# Patient Record
Sex: FEMALE | Race: WHITE | NOT HISPANIC OR LATINO | URBAN - METROPOLITAN AREA
[De-identification: names, ages, dates, MRNs, and addresses within clinical notes are randomized per-mention and may not be internally consistent; named-entity substitution may affect disease eponyms.]

---
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
## 2021-03-11 ENCOUNTER — TELEPHONE (OUTPATIENT)
Dept: INTERNAL MEDICINE | Facility: CLINIC | Age: 21
End: 2021-03-11

## 2021-03-11 ENCOUNTER — OFFICE VISIT (OUTPATIENT)
Dept: INTERNAL MEDICINE | Facility: CLINIC | Age: 21
End: 2021-03-11
Payer: COMMERCIAL

## 2021-03-11 VITALS
BODY MASS INDEX: 20.93 KG/M2 | HEART RATE: 86 BPM | WEIGHT: 125.6 LBS | HEIGHT: 65 IN | SYSTOLIC BLOOD PRESSURE: 104 MMHG | RESPIRATION RATE: 16 BRPM | TEMPERATURE: 98.5 F | DIASTOLIC BLOOD PRESSURE: 60 MMHG | OXYGEN SATURATION: 98 %

## 2021-03-11 DIAGNOSIS — L72.9 CYST OF SKIN: ICD-10-CM

## 2021-03-11 DIAGNOSIS — F84.0 AUTISM SPECTRUM DISORDER: ICD-10-CM

## 2021-03-11 DIAGNOSIS — G47.9 SLEEP DISORDER: ICD-10-CM

## 2021-03-11 DIAGNOSIS — R55 VASOVAGAL SYNCOPE: ICD-10-CM

## 2021-03-11 DIAGNOSIS — Z00.00 ANNUAL VISIT FOR GENERAL ADULT MEDICAL EXAMINATION WITHOUT ABNORMAL FINDINGS: Primary | ICD-10-CM

## 2021-03-11 DIAGNOSIS — E01.2: ICD-10-CM

## 2021-03-11 DIAGNOSIS — Z02.4 ENCOUNTER FOR DRIVER'S LICENSE HISTORY AND PHYSICAL: ICD-10-CM

## 2021-03-11 DIAGNOSIS — F32.4 MAJOR DEPRESSION SINGLE EPISODE, IN PARTIAL REMISSION (CMS/HCC): ICD-10-CM

## 2021-03-11 PROBLEM — F50.9 EATING DISORDER: Status: RESOLVED | Noted: 2017-08-24 | Resolved: 2021-03-11

## 2021-03-11 PROBLEM — F50.9 EATING DISORDER: Status: ACTIVE | Noted: 2017-08-24

## 2021-03-11 PROCEDURE — 99385 PREV VISIT NEW AGE 18-39: CPT | Performed by: NURSE PRACTITIONER

## 2021-03-11 RX ORDER — NORETHINDRONE ACETATE AND ETHINYL ESTRADIOL AND FERROUS FUMARATE 1MG-20(21)
1 KIT ORAL
COMMUNITY
Start: 2021-01-09 | End: 2022-12-20 | Stop reason: ENTERED-IN-ERROR

## 2021-03-11 ASSESSMENT — PATIENT HEALTH QUESTIONNAIRE - PHQ9
SUM OF ALL RESPONSES TO PHQ9 QUESTIONS 1 & 2: 1
SUM OF ALL RESPONSES TO PHQ QUESTIONS 1-9: 2

## 2021-03-11 ASSESSMENT — ENCOUNTER SYMPTOMS
SLEEP DISTURBANCE: 0
ALLERGIC/IMMUNOLOGIC NEGATIVE: 1
HEMATOLOGIC/LYMPHATIC NEGATIVE: 1
RESPIRATORY NEGATIVE: 1
CARDIOVASCULAR NEGATIVE: 1
MUSCULOSKELETAL NEGATIVE: 1
ENDOCRINE NEGATIVE: 1
DYSPHORIC MOOD: 1
FEVER: 0
EYES NEGATIVE: 1
GASTROINTESTINAL NEGATIVE: 1

## 2021-03-11 NOTE — PATIENT INSTRUCTIONS
Thank you for choosing [facility]. Today you had an annual wellness exam. Annual health care visits are a great way to take control of your own health and wellness. Routine health care visits can 1) help find problems early through appropriate screening and 2) help prevent health problems before they occur.   Here are a few things you can do to protect your health and boost your immune system. Please exercise at least 45 minutes 4 days a week.  Walking briskly is a great source of exercise.  The goal is for 150 minutes of aerobic exercise each week and 20 minutes of light weights twice a week.  Please receive a flu shot annually, ideally in October or November. See the dentist twice a year for routine cleaning. See Dermatology for routine skin checks and protect your skin from the sun. Follow a low-cholesterol, plant based diet to lessen the risk of heart disease.   If necessary, supplement with vitamin D3 1000 international units 4-5 days a week especially in the winter months.  Limit alcohol beverages. Protect your sleep. Do not text and drive.     Before signing you drivers license paperwork I would like to review your previous records.     To schedule us of soft tissue please call [phone].

## 2021-03-11 NOTE — PROGRESS NOTES
Subjective      Patient ID: Jud Tanner is a 20 y.o. female.    Presents to [facility], she is home on break from her boarding school, needs drivers license form completed.   H/o narcolepsy, depression and vaso-vagal. Will review records.       The following have been reviewed and updated as appropriate in this visit:  Tobacco  Allergies  Meds  Problems  Fam Hx  Soc Hx         No Known Allergies    Current Outpatient Medications:   •  JUNEL FE 1/20, 28, 1 mg-20 mcg (21)/75 mg (7) per tablet, Take 1 tablet by mouth once daily., Disp: , Rfl:   Past Medical History:   Diagnosis Date   • Anxiety    • Depression      History reviewed. No pertinent surgical history.  Family History   Problem Relation Age of Onset   • No Known Problems Biological Mother    • No Known Problems Biological Father    • No Known Problems Biological Sister    • No Known Problems Maternal Grandmother    • Lung cancer Maternal Grandfather    • No Known Problems Paternal Grandmother    • No Known Problems Paternal Grandfather      Social History     Socioeconomic History   • Marital status: Single     Spouse name: Not on file   • Number of children: Not on file   • Years of education: Not on file   • Highest education level: Not on file   Occupational History   • Not on file   Social Needs   • Financial resource strain: Not on file   • Food insecurity     Worry: Not on file     Inability: Not on file   • Transportation needs     Medical: Not on file     Non-medical: Not on file   Tobacco Use   • Smoking status: Never Smoker   • Smokeless tobacco: Never Used   Substance and Sexual Activity   • Alcohol use: Yes     Comment: almost noneharpreet    • Drug use: Yes     Types: Marijuana   • Sexual activity: Not Currently     Birth control/protection: OCP   Lifestyle   • Physical activity     Days per week: Not on file     Minutes per session: Not on file   • Stress: Not on file   Relationships   • Social connections     Talks on phone: Not on  "file     Gets together: Not on file     Attends Hindu service: Not on file     Active member of club or organization: Not on file     Attends meetings of clubs or organizations: Not on file     Relationship status: Not on file   • Intimate partner violence     Fear of current or ex partner: Not on file     Emotionally abused: Not on file     Physically abused: Not on file     Forced sexual activity: Not on file   Other Topics Concern   • Not on file   Social History Narrative    Lives at home with parents and sister.     Safe at home.     Goes to a boarding school, in her senior year,  home for break, [employer]- in [place]    Exercise: school sports, signed up for softball    Diet: no restrictions or preferences        Review of Systems   Constitutional: Negative for fever.   HENT: Negative.    Eyes: Negative.    Respiratory: Negative.    Cardiovascular: Negative.    Gastrointestinal: Negative.    Endocrine: Negative.    Genitourinary: Negative.    Musculoskeletal: Negative.    Skin: Negative.    Allergic/Immunologic: Negative.    Hematological: Negative.    Psychiatric/Behavioral: Positive for dysphoric mood. Negative for sleep disturbance.       Objective     Vitals:    03/11/21 1306   BP: 104/60   BP Location: Left upper arm   Patient Position: Sitting   Pulse: 86   Resp: 16   Temp: 36.9 °C (98.5 °F)   TempSrc: Temporal   SpO2: 98%   Weight: 57 kg (125 lb 9.6 oz)   Height: 1.651 m (5' 5\")     Body mass index is 20.9 kg/m².     Vision screen: Without Correction, R Eye 20/20, Left 20/20, Both eyes 20/20    Physical Exam  Vitals signs reviewed.   Constitutional:       Appearance: She is well-developed.   HENT:      Head: Normocephalic and atraumatic.      Right Ear: Hearing, tympanic membrane, ear canal and external ear normal.      Left Ear: Hearing, tympanic membrane, ear canal and external ear normal.      Nose:      Right Sinus: No maxillary sinus tenderness or frontal sinus tenderness.      Left " Sinus: No maxillary sinus tenderness or frontal sinus tenderness.      Mouth/Throat:      Pharynx: Uvula midline.      Tonsils: No tonsillar exudate.   Eyes:      General: Lids are normal.      Conjunctiva/sclera: Conjunctivae normal.      Pupils: Pupils are equal, round, and reactive to light.   Neck:      Musculoskeletal: Normal range of motion and neck supple.      Thyroid: No thyromegaly.   Cardiovascular:      Rate and Rhythm: Normal rate and regular rhythm.      Heart sounds: Normal heart sounds. No murmur. No friction rub. No gallop.    Pulmonary:      Effort: Pulmonary effort is normal. No respiratory distress.      Breath sounds: Normal breath sounds. No wheezing or rales.   Chest:      Chest wall: No tenderness.   Abdominal:      General: Bowel sounds are normal. There is no distension.      Palpations: Abdomen is soft. There is no mass.      Tenderness: There is no abdominal tenderness.   Musculoskeletal: Normal range of motion.   Lymphadenopathy:      Cervical: No cervical adenopathy.   Skin:     General: Skin is warm and dry.   Neurological:      Mental Status: She is alert and oriented to person, place, and time.      Sensory: No sensory deficit.      Motor: No tremor.      Gait: Gait normal.   Psychiatric:         Attention and Perception: Attention normal.         Mood and Affect: Affect is flat.         Speech: Speech normal.         Behavior: Behavior is withdrawn.         Thought Content: Thought content normal.         Judgment: Judgment normal.      Comments: Does not make eye contact often         Assessment/Plan   Problem List Items Addressed This Visit        Circulatory    Vasovagal syncope     Will review records.   Pt was on Fluticortisone, no longer on this medicaiton.             Digestive    Iodine-deficiency-related endemic goiter     Believes at one point she was on thyroid supplement.   Will obtain records to review.             Other    Autism spectrum disorder     Pt reports she  has a language waiver at school.          Major depression single episode, in partial remission (CMS/HCC)     Follows with Brynn Gaona weekly for therapy.   Has been on Wellbutrin and Lamictal in the past but is presently not on any medications.     PHQ 2 to 9:  Will the patient answer the depression questions?: Y    Over the past 2 weeks, how often have you been bothered by feeling little interest or pleasure in doing things?: Not at all    Over the past 2 weeks, how often have you been bothered by feeling down, depressed, or hopeless?: Several days (down and depressed)    Depression Risk: 1    Over the past 2 weeks, how often have you been bothered by trouble falling or staying asleep, or sleeping too much?: Not at all    Over the past 2 weeks, how often have you been bothered by feeling tired or having little energy?: Not at all    Over the past 2 weeks, how often have you been bothered by a poor appetite or overeating?: Not at all    Over the past 2 weeks, how often have you been bothered by feeling bad about yourself - or that you are a failure or have let yourself or your family down?: Several days    Over the past 2 weeks, how often have you been bothered by trouble concentrating on things, such as reading the newspaper or watching television?: Not at all    Over the past 2 weeks, how often have you been bothered by moving or speaking so slowly that other people could have noticed? Or the opposite - being so fidgety or restless that you have been moving around a lot more than usual?: Not at all    Over the past 2 weeks, how often have you been bothered by thoughts that you would be better off dead or of hurting yourself in some way?: Not at all    Depression Risk Score: 2    If You Checked Off Any Problems, How Difficult Have These Problems Made It For You to Do Your Work, Take Care of Things at Home, or Get Along with Other People?: somewhat difficult (getting along with other people at home can sometimes  be hard)    PHQ interpretation: PHQ result indicates none-minimal depression                   Sleep disorder     Pt endorses h/o narcolepsy but denies trouble sleeping now.          Annual visit for general adult medical examination without abnormal findings - Primary     Reviewed age appropriate screenings.     Immunization History   Administered Date(s) Administered   • DTaP 2001, 2001, 01/25/2002, 10/29/2002, 09/20/2005   • DTaP 5 2001, 2001, 01/25/2002, 10/29/2002, 09/20/2005   • H1N1 All Forms 11/10/2009   • HPV, Quadrivalent 11/14/2012, 01/15/2013, 06/04/2013   • Hep A, 2 Dose 08/28/2007, 04/26/2008   • Hep A, Unspecified 08/28/2007, 04/26/2008   • Hep B, Adolescent or Pediatric 2001, 2001, 04/29/2002   • HiB 2001, 2001, 01/25/2002, 10/29/2002   • IPV 2001, 2001, 01/25/2002, 09/20/2005   • Influenza Vaccine Quadrivalent Intradermal  10/03/2020   • Influenza Vaccine Quadrivalent Preservative Free 6 Mons and Up 10/01/2019   • Influenza Vaccine Quadrivalent Preservative Free 6-35 Months 09/21/2017   • Influenza, Unspecified 11/07/2007, 11/26/2008, 12/21/2009, 09/21/2017, 10/03/2020   • MMR 08/02/2002, 09/20/2005   • Meningococcal Group B Vaccine 08/23/2019   • Meningococcal MCV4P 11/14/2012, 11/14/2012, 08/23/2019   • PPD Test 08/20/2019   • Pneumococcal Conjugate 2001, 2001, 01/25/2002, 04/06/2003   • Td 11/14/2012   • Tdap 08/23/2019   • Varicella 08/02/2002, 11/26/2008         Sees dentist every 6 months? Yes or no    Eye doctor: glasses or contacts            Cyst of skin     Left temple, pt reports she has had it for years, has never been evaluated, no changes.   Rec us for evaluation.          Relevant Orders    ULTRASOUND SOFT TISSUE    Encounter for 's license history and physical     Would like to review records from vasovagal episodes and diagnosis of sleep disorder before signing her ppw.

## 2021-03-11 NOTE — ASSESSMENT & PLAN NOTE
Believes at one point she was on thyroid supplement.   Will obtain records to review.   
Follows with Brynn Gaona weekly for therapy.   Has been on Wellbutrin and Lamictal in the past but is presently not on any medications.     PHQ 2 to 9:  Will the patient answer the depression questions?: Y    Over the past 2 weeks, how often have you been bothered by feeling little interest or pleasure in doing things?: Not at all    Over the past 2 weeks, how often have you been bothered by feeling down, depressed, or hopeless?: Several days (down and depressed)    Depression Risk: 1    Over the past 2 weeks, how often have you been bothered by trouble falling or staying asleep, or sleeping too much?: Not at all    Over the past 2 weeks, how often have you been bothered by feeling tired or having little energy?: Not at all    Over the past 2 weeks, how often have you been bothered by a poor appetite or overeating?: Not at all    Over the past 2 weeks, how often have you been bothered by feeling bad about yourself - or that you are a failure or have let yourself or your family down?: Several days    Over the past 2 weeks, how often have you been bothered by trouble concentrating on things, such as reading the newspaper or watching television?: Not at all    Over the past 2 weeks, how often have you been bothered by moving or speaking so slowly that other people could have noticed? Or the opposite - being so fidgety or restless that you have been moving around a lot more than usual?: Not at all    Over the past 2 weeks, how often have you been bothered by thoughts that you would be better off dead or of hurting yourself in some way?: Not at all    Depression Risk Score: 2    If You Checked Off Any Problems, How Difficult Have These Problems Made It For You to Do Your Work, Take Care of Things at Home, or Get Along with Other People?: somewhat difficult (getting along with other people at home can sometimes be hard)    PHQ interpretation: PHQ result indicates none-minimal depression            
Left temple, pt reports she has had it for years, has never been evaluated, no changes.   Rec us for evaluation.   
Pt endorses h/o narcolepsy but denies trouble sleeping now.   
Pt reports she has a language waiver at school.   
Reviewed age appropriate screenings.     Immunization History   Administered Date(s) Administered   • DTaP 2001, 2001, 01/25/2002, 10/29/2002, 09/20/2005   • DTaP 5 2001, 2001, 01/25/2002, 10/29/2002, 09/20/2005   • H1N1 All Forms 11/10/2009   • HPV, Quadrivalent 11/14/2012, 01/15/2013, 06/04/2013   • Hep A, 2 Dose 08/28/2007, 04/26/2008   • Hep A, Unspecified 08/28/2007, 04/26/2008   • Hep B, Adolescent or Pediatric 2001, 2001, 04/29/2002   • HiB 2001, 2001, 01/25/2002, 10/29/2002   • IPV 2001, 2001, 01/25/2002, 09/20/2005   • Influenza Vaccine Quadrivalent Intradermal  10/03/2020   • Influenza Vaccine Quadrivalent Preservative Free 6 Mons and Up 10/01/2019   • Influenza Vaccine Quadrivalent Preservative Free 6-35 Months 09/21/2017   • Influenza, Unspecified 11/07/2007, 11/26/2008, 12/21/2009, 09/21/2017, 10/03/2020   • MMR 08/02/2002, 09/20/2005   • Meningococcal Group B Vaccine 08/23/2019   • Meningococcal MCV4P 11/14/2012, 11/14/2012, 08/23/2019   • PPD Test 08/20/2019   • Pneumococcal Conjugate 2001, 2001, 01/25/2002, 04/06/2003   • Td 11/14/2012   • Tdap 08/23/2019   • Varicella 08/02/2002, 11/26/2008         Sees dentist every 6 months? Yes or no    Eye doctor: glasses or contacts     
Will review records.   Pt was on Fluticortisone, no longer on this medicaiton.   
Would like to review records from vasovagal episodes and diagnosis of sleep disorder before signing her ppw.   
Alert/Cooperative/Awake

## 2021-04-06 ENCOUNTER — TELEPHONE (OUTPATIENT)
Dept: INTERNAL MEDICINE | Facility: CLINIC | Age: 21
End: 2021-04-06

## 2021-04-06 NOTE — TELEPHONE ENCOUNTER
Katey    Ajit's mother calling you back, she can be reached at [phone]    Thank You   Patient confirmed 7-10 appointment with Ms Spangler -- Labs and infusion starting at 1000

## 2021-04-06 NOTE — TELEPHONE ENCOUNTER
S/w pt's mom. Pt cleared by cardiology in 2016, she had her permit but it , pt's previous pediatrician was Dr. Romero. Have lm to discuss.

## 2021-04-08 ENCOUNTER — TELEPHONE (OUTPATIENT)
Dept: INTERNAL MEDICINE | Facility: CLINIC | Age: 21
End: 2021-04-08

## 2021-04-08 NOTE — TELEPHONE ENCOUNTER
Pt with h/o vasovagal episodes in 2016/2017, had extensive work up with Neurology and Cardiology and was cleared for all activity. Thought was that episodes were related to anxiety/emotional stress. Pt enrolled in a residential program which was very therapeutic and has had no reoccurrence of symptoms. She is stable.

## 2021-04-12 ENCOUNTER — TELEPHONE (OUTPATIENT)
Dept: INTERNAL MEDICINE | Facility: CLINIC | Age: 21
End: 2021-04-12

## 2021-06-14 ENCOUNTER — OFFICE VISIT (OUTPATIENT)
Dept: INTERNAL MEDICINE | Facility: CLINIC | Age: 21
End: 2021-06-14
Payer: COMMERCIAL

## 2021-06-14 VITALS
TEMPERATURE: 97.3 F | SYSTOLIC BLOOD PRESSURE: 110 MMHG | OXYGEN SATURATION: 99 % | RESPIRATION RATE: 16 BRPM | BODY MASS INDEX: 20.96 KG/M2 | HEIGHT: 65 IN | HEART RATE: 73 BPM | WEIGHT: 125.8 LBS | DIASTOLIC BLOOD PRESSURE: 60 MMHG

## 2021-06-14 DIAGNOSIS — N94.6 DYSMENORRHEA: ICD-10-CM

## 2021-06-14 DIAGNOSIS — E01.2: ICD-10-CM

## 2021-06-14 DIAGNOSIS — Z02.0 ENCOUNTER FOR SCHOOL EXAMINATION: Primary | ICD-10-CM

## 2021-06-14 DIAGNOSIS — F32.4 MAJOR DEPRESSION SINGLE EPISODE, IN PARTIAL REMISSION (CMS/HCC): ICD-10-CM

## 2021-06-14 PROCEDURE — 99213 OFFICE O/P EST LOW 20 MIN: CPT | Performed by: NURSE PRACTITIONER

## 2021-06-14 PROCEDURE — 3008F BODY MASS INDEX DOCD: CPT | Performed by: NURSE PRACTITIONER

## 2021-06-14 ASSESSMENT — ENCOUNTER SYMPTOMS
MUSCULOSKELETAL NEGATIVE: 1
CONSTITUTIONAL NEGATIVE: 1
PSYCHIATRIC NEGATIVE: 1

## 2021-06-14 ASSESSMENT — PATIENT HEALTH QUESTIONNAIRE - PHQ9: SUM OF ALL RESPONSES TO PHQ9 QUESTIONS 1 & 2: 0

## 2021-06-14 NOTE — ASSESSMENT & PLAN NOTE
Currently on OCP, interested in implant.   Rec OB GYN  Dr. Hayley Lopez and Dr. Mirta Swartz [phone]  Dr. Berkowitz/Luis [phone]  [facility] in [place] [phone]

## 2021-06-14 NOTE — PROGRESS NOTES
"Subjective      Patient ID: Jud Tanner is a 20 y.o. female.    Pt just graduated high school. 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   Mom with Jud today- reports thyroid goiter is inaccurate, will remove from chart.       The following have been reviewed and updated as appropriate in this visit:  Allergies  Meds  Problems          No Known Allergies    Current Outpatient Medications   Medication Instructions   • JUNEL FE 1/20, 28, 1 mg-20 mcg (21)/75 mg (7) per tablet 1 tablet, oral, Daily (6a)       Review of Systems   Constitutional: Negative.    Musculoskeletal: Negative.    Psychiatric/Behavioral: Negative.        Objective     Vitals:    06/14/21 1413   BP: 110/60   BP Location: Right upper arm   Patient Position: Sitting   Pulse: 73   Resp: 16   Temp: 36.3 °C (97.3 °F)   TempSrc: Temporal   SpO2: 99%   Weight: 57.1 kg (125 lb 12.8 oz)   Height: 1.651 m (5' 5\")       Physical Exam  Vitals reviewed.   Constitutional:       Appearance: She is well-developed.   HENT:      Head: Normocephalic and atraumatic.      Right Ear: Hearing, tympanic membrane, ear canal and external ear normal.      Left Ear: Hearing, tympanic membrane, ear canal and external ear normal.      Nose:      Right Sinus: No maxillary sinus tenderness or frontal sinus tenderness.      Left Sinus: No maxillary sinus tenderness or frontal sinus tenderness.      Mouth/Throat:      Pharynx: Uvula midline.      Tonsils: No tonsillar exudate.   Eyes:      General: Lids are normal.      Conjunctiva/sclera: Conjunctivae normal.      Pupils: Pupils are equal, round, and reactive to light.   Neck:      Thyroid: No thyromegaly.   Cardiovascular:      Rate and Rhythm: Normal rate and regular rhythm.      Heart sounds: Normal heart sounds.   Pulmonary:      Effort: Pulmonary effort is normal. No respiratory distress.      Breath sounds: Normal breath " sounds. No wheezing or rales.   Abdominal:      General: Bowel sounds are normal. There is no distension.      Palpations: Abdomen is soft. There is no mass.      Tenderness: There is no abdominal tenderness.   Musculoskeletal:         General: Normal range of motion.      Cervical back: Normal range of motion and neck supple.   Lymphadenopathy:      Cervical: No cervical adenopathy.   Skin:     General: Skin is warm and dry.   Neurological:      Mental Status: She is alert and oriented to person, place, and time.      Sensory: No sensory deficit.      Motor: No tremor.      Gait: Gait normal.   Psychiatric:         Behavior: Behavior normal.         Thought Content: Thought content normal.         Judgment: Judgment normal.         Assessment/Plan   Problem List Items Addressed This Visit        Nervous    Dysmenorrhea     Currently on OCP, interested in implant.   Rec OB GYN  Dr. Hayley Lopez and Dr. Mirta Swartz [phone]  Dr. Berkowitz/Luis [phone]  [facility] in [place] [phone]                Digestive    Iodine-deficiency-related endemic goiter     Resolved.   N/a per mom.  Work up in 2017 wnl.             Other    Major depression single episode, in partial remission (CMS/HCC)     Mood stable.          Encounter for school examination - Primary     PPW completed.   Immunization History   Administered Date(s) Administered   • DTaP 2001, 2001, 01/25/2002, 10/29/2002, 09/20/2005   • DTaP 5 2001, 2001, 01/25/2002, 10/29/2002, 09/20/2005   • H1N1 All Forms 11/10/2009   • HPV, Quadrivalent 11/14/2012, 01/15/2013, 06/04/2013   • Hep A, 2 Dose 08/28/2007, 04/26/2008   • Hep A, Unspecified 08/28/2007, 04/26/2008   • Hep B, Adolescent or Pediatric 2001, 2001, 04/29/2002   • HiB 2001, 2001, 01/25/2002, 10/29/2002   • IPV 2001, 2001, 01/25/2002, 09/20/2005   • Influenza Vaccine Quadrivalent Intradermal  10/03/2020   • Influenza Vaccine Quadrivalent  Preservative Free 6 Mons and Up 10/01/2019   • Influenza Vaccine Quadrivalent Preservative Free 6-35 Months 09/21/2017   • Influenza, Unspecified 11/07/2007, 11/26/2008, 12/21/2009, 09/21/2017, 10/03/2020   • MMR 08/02/2002, 09/20/2005   • Meningococcal Group B Vaccine 08/23/2019   • Meningococcal MCV4P 11/14/2012, 11/14/2012, 08/23/2019   • PPD Test 08/20/2019   • Pneumococcal Conjugate 2001, 2001, 01/25/2002, 04/06/2003   • Sars-cov-2 (Covid-19) Vaccine, Pfizer 04/20/2021, 05/13/2021   • Td 11/14/2012   • Tdap 08/23/2019   • Varicella 08/02/2002, 11/26/2008

## 2021-06-14 NOTE — PATIENT INSTRUCTIONS
OB GYN  Dr. Hayley Lopez and Dr. Mirta Swartz at [facility] [phone]  Dr. Berkowitz/Luis CORTÉS at [facility] [phone]  [facility] in [place] [phone]

## 2021-06-14 NOTE — ASSESSMENT & PLAN NOTE
PPW completed.   Immunization History   Administered Date(s) Administered   • DTaP 2001, 2001, 01/25/2002, 10/29/2002, 09/20/2005   • DTaP 5 2001, 2001, 01/25/2002, 10/29/2002, 09/20/2005   • H1N1 All Forms 11/10/2009   • HPV, Quadrivalent 11/14/2012, 01/15/2013, 06/04/2013   • Hep A, 2 Dose 08/28/2007, 04/26/2008   • Hep A, Unspecified 08/28/2007, 04/26/2008   • Hep B, Adolescent or Pediatric 2001, 2001, 04/29/2002   • HiB 2001, 2001, 01/25/2002, 10/29/2002   • IPV 2001, 2001, 01/25/2002, 09/20/2005   • Influenza Vaccine Quadrivalent Intradermal  10/03/2020   • Influenza Vaccine Quadrivalent Preservative Free 6 Mons and Up 10/01/2019   • Influenza Vaccine Quadrivalent Preservative Free 6-35 Months 09/21/2017   • Influenza, Unspecified 11/07/2007, 11/26/2008, 12/21/2009, 09/21/2017, 10/03/2020   • MMR 08/02/2002, 09/20/2005   • Meningococcal Group B Vaccine 08/23/2019   • Meningococcal MCV4P 11/14/2012, 11/14/2012, 08/23/2019   • PPD Test 08/20/2019   • Pneumococcal Conjugate 2001, 2001, 01/25/2002, 04/06/2003   • Sars-cov-2 (Covid-19) Vaccine, Pfizer 04/20/2021, 05/13/2021   • Td 11/14/2012   • Tdap 08/23/2019   • Varicella 08/02/2002, 11/26/2008

## 2021-07-07 ENCOUNTER — TRANSCRIBE ORDERS (OUTPATIENT)
Dept: SCHEDULING | Age: 21
End: 2021-07-07

## 2021-07-07 DIAGNOSIS — M41.20 OTHER IDIOPATHIC SCOLIOSIS, SITE UNSPECIFIED: Primary | ICD-10-CM

## 2021-07-07 DIAGNOSIS — R29.898 OTHER SYMPTOMS AND SIGNS INVOLVING THE MUSCULOSKELETAL SYSTEM: ICD-10-CM

## 2021-07-12 ENCOUNTER — HOSPITAL ENCOUNTER (OUTPATIENT)
Dept: RADIOLOGY | Age: 21
Discharge: HOME | End: 2021-07-12
Attending: PHYSICAL MEDICINE & REHABILITATION
Payer: COMMERCIAL

## 2021-07-12 DIAGNOSIS — R29.898 OTHER SYMPTOMS AND SIGNS INVOLVING THE MUSCULOSKELETAL SYSTEM: ICD-10-CM

## 2021-07-12 DIAGNOSIS — M41.20 OTHER IDIOPATHIC SCOLIOSIS, SITE UNSPECIFIED: ICD-10-CM

## 2022-12-20 DIAGNOSIS — Z00.00 GENERAL MEDICAL EXAM: Primary | ICD-10-CM

## 2022-12-20 DIAGNOSIS — Z11.3 SCREENING EXAMINATION FOR STD (SEXUALLY TRANSMITTED DISEASE): ICD-10-CM

## 2023-08-12 ENCOUNTER — OFFICE VISIT (OUTPATIENT)
Dept: URGENT CARE | Facility: CLINIC | Age: 23
End: 2023-08-12
Payer: COMMERCIAL

## 2023-08-12 VITALS
DIASTOLIC BLOOD PRESSURE: 79 MMHG | HEART RATE: 136 BPM | SYSTOLIC BLOOD PRESSURE: 137 MMHG | RESPIRATION RATE: 18 BRPM | TEMPERATURE: 99.6 F | OXYGEN SATURATION: 100 %

## 2023-08-12 DIAGNOSIS — N30.00 ACUTE CYSTITIS WITHOUT HEMATURIA: Primary | ICD-10-CM

## 2023-08-12 PROCEDURE — 99213 OFFICE O/P EST LOW 20 MIN: CPT | Performed by: FAMILY MEDICINE

## 2023-08-12 RX ORDER — SULFAMETHOXAZOLE AND TRIMETHOPRIM 800; 160 MG/1; MG/1
1 TABLET ORAL EVERY 12 HOURS SCHEDULED
Qty: 14 TABLET | Refills: 0 | Status: SHIPPED | OUTPATIENT
Start: 2023-08-12 | End: 2023-08-19

## 2023-08-12 RX ORDER — ESCITALOPRAM OXALATE 5 MG/1
TABLET ORAL EVERY 24 HOURS
COMMUNITY
Start: 2023-07-28

## 2023-08-12 NOTE — PROGRESS NOTES
[facility]        NAME: Pham Hernadez is a 26 y.o. female  : 2000    MRN: [id number]  DATE: 2023  TIME: 6:41 PM    Assessment and Plan   Acute cystitis without hematuria [N30.00]  1. Acute cystitis without hematuria  sulfamethoxazole-trimethoprim (BACTRIM DS) 800-160 mg per tablet            Patient Instructions       Follow up with PCP in 3-5 days. Proceed to  ER if symptoms worsen. Chief Complaint     Chief Complaint   Patient presents with   • Fever     Patient has had a fever and bilateral knee  and leg pain that started last night. History of Present Illness       26-year-old female with 1 day history of urinary burning, fevers and body aches. Review of Systems   Review of Systems   Constitutional: Negative. Negative for fever. HENT: Negative. Eyes: Negative. Respiratory: Negative. Cardiovascular: Negative. Gastrointestinal: Negative. Genitourinary: Positive for dysuria and frequency. Musculoskeletal: Negative. Skin: Negative. Allergic/Immunologic: Negative. Neurological: Negative. Hematological: Negative. Psychiatric/Behavioral: Negative. Current Medications       Current Outpatient Medications:   •  escitalopram (Lexapro) 5 mg tablet, every 24 hours, Disp: , Rfl:   •  sulfamethoxazole-trimethoprim (BACTRIM DS) 800-160 mg per tablet, Take 1 tablet by mouth every 12 (twelve) hours for 7 days, Disp: 14 tablet, Rfl: 0    Current Allergies     Allergies as of 2023   • (No Known Allergies)            The following portions of the patient's history were reviewed and updated as appropriate: allergies, current medications, past family history, past medical history, past social history, past surgical history and problem list.     No past medical history on file. No past surgical history on file. No family history on file. Medications have been verified.         Objective   /79   Pulse (!) 136   Temp 99.6 °F (37.6 °C)   Resp 18   SpO2 100%   No LMP recorded. Physical Exam     Physical Exam  Vitals and nursing note reviewed. Constitutional:       Appearance: She is well-developed. HENT:      Head: Normocephalic. Eyes:      Pupils: Pupils are equal, round, and reactive to light. Cardiovascular:      Rate and Rhythm: Normal rate. Pulmonary:      Effort: Pulmonary effort is normal.   Musculoskeletal:         General: Normal range of motion. Skin:     General: Skin is warm and dry. Neurological:      Mental Status: She is alert and oriented to person, place, and time.